# Patient Record
Sex: MALE | Race: WHITE | NOT HISPANIC OR LATINO | URBAN - METROPOLITAN AREA
[De-identification: names, ages, dates, MRNs, and addresses within clinical notes are randomized per-mention and may not be internally consistent; named-entity substitution may affect disease eponyms.]

---

## 2021-08-24 ENCOUNTER — LAB OUTSIDE AN ENCOUNTER (OUTPATIENT)
Dept: URBAN - METROPOLITAN AREA CLINIC 107 | Facility: CLINIC | Age: 66
End: 2021-08-24

## 2021-08-24 ENCOUNTER — WEB ENCOUNTER (OUTPATIENT)
Dept: URBAN - METROPOLITAN AREA CLINIC 107 | Facility: CLINIC | Age: 66
End: 2021-08-24

## 2021-08-24 ENCOUNTER — OFFICE VISIT (OUTPATIENT)
Dept: URBAN - METROPOLITAN AREA CLINIC 107 | Facility: CLINIC | Age: 66
End: 2021-08-24
Payer: COMMERCIAL

## 2021-08-24 VITALS
BODY MASS INDEX: 31.34 KG/M2 | HEART RATE: 61 BPM | WEIGHT: 195 LBS | TEMPERATURE: 98.4 F | SYSTOLIC BLOOD PRESSURE: 106 MMHG | DIASTOLIC BLOOD PRESSURE: 69 MMHG | RESPIRATION RATE: 18 BRPM | HEIGHT: 66 IN

## 2021-08-24 DIAGNOSIS — K62.5 RECTAL BLEED: ICD-10-CM

## 2021-08-24 DIAGNOSIS — K57.32 SIGMOID DIVERTICULITIS: ICD-10-CM

## 2021-08-24 PROCEDURE — 99204 OFFICE O/P NEW MOD 45 MIN: CPT | Performed by: INTERNAL MEDICINE

## 2021-08-24 RX ORDER — WHEAT DEXTRIN 3 G/3.5 G
AS DIRECTED POWDER (GRAM) ORAL
Qty: 90 GRAM | Refills: 3 | OUTPATIENT
Start: 2021-08-24 | End: 2022-08-19

## 2021-08-24 RX ORDER — SODIUM, POTASSIUM,MAG SULFATES 17.5-3.13G
354 ML SOLUTION, RECONSTITUTED, ORAL ORAL ONCE
Qty: 354 MILLILITER | Refills: 0 | OUTPATIENT
Start: 2021-08-24 | End: 2021-08-25

## 2021-08-24 NOTE — HPI-TODAY'S VISIT:
PatientPresents today for initial evaluation.  He was recently diagnosed with uncomplicated sigmoid diverticulitis in July when he presented with left lower quadrant pain.  He reports he had multiple episodes in the past over the last 5 to 10 years.  These do not happen yearly.  He was treated with antibiotics and reports complete resolution of his symptoms.  I did review his CBC and CMP which were normal.  His last colonoscopy was in 2014.  He does report these been having increasing red blood per rectum recently.  He thinks this is from hemorrhoids.  He did have hemorrhoids on previous colonoscopies by his report.  He denies straining.  He has not been on a fiber supplement.  No family history of colon cancer.  No other precipitating factors.

## 2021-08-26 PROBLEM — 427910000: Status: ACTIVE | Noted: 2021-08-24

## 2021-08-30 ENCOUNTER — OFFICE VISIT (OUTPATIENT)
Dept: URBAN - METROPOLITAN AREA SURGERY CENTER 25 | Facility: SURGERY CENTER | Age: 66
End: 2021-08-30
Payer: COMMERCIAL

## 2021-08-30 ENCOUNTER — CLAIMS CREATED FROM THE CLAIM WINDOW (OUTPATIENT)
Dept: URBAN - METROPOLITAN AREA CLINIC 4 | Facility: CLINIC | Age: 66
End: 2021-08-30
Payer: COMMERCIAL

## 2021-08-30 DIAGNOSIS — Z87.19 HISTORY OF DIVERTICULITIS: ICD-10-CM

## 2021-08-30 DIAGNOSIS — Z09 EXAMINATION FOR, FOLLOW-UP: ICD-10-CM

## 2021-08-30 DIAGNOSIS — D12.8 BENIGN NEOPLASM OF RECTUM: ICD-10-CM

## 2021-08-30 DIAGNOSIS — D12.8 ADENOMATOUS POLYP OF RECTUM: ICD-10-CM

## 2021-08-30 PROCEDURE — 88305 TISSUE EXAM BY PATHOLOGIST: CPT | Performed by: PATHOLOGY

## 2021-08-30 PROCEDURE — G8907 PT DOC NO EVENTS ON DISCHARG: HCPCS | Performed by: INTERNAL MEDICINE

## 2021-08-30 PROCEDURE — 45385 COLONOSCOPY W/LESION REMOVAL: CPT | Performed by: INTERNAL MEDICINE

## 2021-08-30 RX ORDER — WHEAT DEXTRIN 3 G/3.5 G
AS DIRECTED POWDER (GRAM) ORAL
Qty: 90 GRAM | Refills: 3 | Status: ACTIVE | COMMUNITY
Start: 2021-08-24 | End: 2022-08-19

## 2021-10-19 ENCOUNTER — OFFICE VISIT (OUTPATIENT)
Dept: URBAN - METROPOLITAN AREA CLINIC 107 | Facility: CLINIC | Age: 66
End: 2021-10-19

## 2021-12-01 ENCOUNTER — OFFICE VISIT (OUTPATIENT)
Dept: URBAN - METROPOLITAN AREA CLINIC 107 | Facility: CLINIC | Age: 66
End: 2021-12-01
Payer: COMMERCIAL

## 2021-12-01 VITALS
HEART RATE: 59 BPM | BODY MASS INDEX: 31.82 KG/M2 | TEMPERATURE: 97.8 F | HEIGHT: 66 IN | WEIGHT: 198 LBS | RESPIRATION RATE: 18 BRPM | SYSTOLIC BLOOD PRESSURE: 120 MMHG | DIASTOLIC BLOOD PRESSURE: 77 MMHG

## 2021-12-01 DIAGNOSIS — D12.8 ADENOMATOUS RECTAL POLYP: ICD-10-CM

## 2021-12-01 DIAGNOSIS — K62.5 RECTAL BLEEDING: ICD-10-CM

## 2021-12-01 DIAGNOSIS — Z87.19 HISTORY OF DIVERTICULITIS: ICD-10-CM

## 2021-12-01 PROCEDURE — 99214 OFFICE O/P EST MOD 30 MIN: CPT | Performed by: INTERNAL MEDICINE

## 2021-12-01 RX ORDER — HYDROCORTISONE ACETATE 25 MG/1
1 SUPPOSITORY SUPPOSITORY RECTAL
Qty: 10 | Refills: 1 | OUTPATIENT
Start: 2021-12-01 | End: 2021-12-21

## 2021-12-01 RX ORDER — WHEAT DEXTRIN 3 G/3.5 G
AS DIRECTED POWDER (GRAM) ORAL
Qty: 90 GRAM | Refills: 3 | Status: ACTIVE | COMMUNITY
Start: 2021-08-24 | End: 2022-08-19

## 2021-12-01 NOTE — HPI-TODAY'S VISIT:
Patient returns today in follow-up.  He had a recent episode of diverticulitis.  After which she underwent colonoscopy which showed diverticulosis as well as a 10 mm rectal tubular adenoma.  This was removed.  Pathology results reviewed with the patient.  He has had problems with intermittent rectal bleeding which she says has been persistent.  He does have occasional straining.  He denies any significant constipation.  Little hemorrhoidal tissue seen on examination.  He states this has been going on since he was an adolescent.  Denies any abdominal pain, nausea or vomiting.  No weight loss or other new complaints.

## 2024-02-09 ENCOUNTER — OV NP (OUTPATIENT)
Dept: URBAN - METROPOLITAN AREA CLINIC 113 | Facility: CLINIC | Age: 69
End: 2024-02-09

## 2024-02-09 VITALS
DIASTOLIC BLOOD PRESSURE: 87 MMHG | HEIGHT: 66 IN | HEART RATE: 57 BPM | TEMPERATURE: 97.4 F | RESPIRATION RATE: 18 BRPM | WEIGHT: 196.4 LBS | SYSTOLIC BLOOD PRESSURE: 125 MMHG | BODY MASS INDEX: 31.57 KG/M2

## 2024-02-09 DIAGNOSIS — K62.5 RECTAL BLEEDING: ICD-10-CM

## 2024-02-09 DIAGNOSIS — K57.32 SIGMOID DIVERTICULITIS: ICD-10-CM

## 2024-02-09 DIAGNOSIS — D12.8 ADENOMATOUS RECTAL POLYP: ICD-10-CM

## 2024-02-09 NOTE — HPI-TODAY'S VISIT:
67 y/o male presenting for f/u. He was last seen in Dec 2021 for f/u after diverticulosis. He was found to have a 10mm TA and recommended to f/u in 3 yrs. He is here today for hematochezia. He has bleeding once every few months when wiping. He has not had any lightheadedness or chest pain.   12/2021 Patient returns today in follow-up.  He had a recent episode of diverticulitis.  After which she underwent colonoscopy which showed diverticulosis as well as a 10 mm rectal tubular adenoma.  This was removed.  Pathology results reviewed with the patient.  He has had problems with intermittent rectal bleeding which she says has been persistent.  He does have occasional straining.  He denies any significant constipation.  Little hemorrhoidal tissue seen on examination.  He states this has been going on since he was an adolescent.  Denies any abdominal pain, nausea or vomiting.  No weight loss or other new complaints.

## 2024-02-14 ENCOUNTER — LAB (OUTPATIENT)
Dept: URBAN - METROPOLITAN AREA CLINIC 4 | Facility: CLINIC | Age: 69
End: 2024-02-14
Payer: COMMERCIAL

## 2024-02-14 ENCOUNTER — COLON (OUTPATIENT)
Dept: URBAN - METROPOLITAN AREA SURGERY CENTER 25 | Facility: SURGERY CENTER | Age: 69
End: 2024-02-14
Payer: COMMERCIAL

## 2024-02-14 DIAGNOSIS — D12.5 BENIGN NEOPLASM OF SIGMOID COLON: ICD-10-CM

## 2024-02-14 DIAGNOSIS — D12.0 BENIGN NEOPLASM OF CECUM: ICD-10-CM

## 2024-02-14 DIAGNOSIS — K64.1 GRADE II HEMORRHOIDS: ICD-10-CM

## 2024-02-14 DIAGNOSIS — K62.5 RECTAL BLEEDING: ICD-10-CM

## 2024-02-14 PROCEDURE — 45385 COLONOSCOPY W/LESION REMOVAL: CPT | Performed by: INTERNAL MEDICINE

## 2024-02-14 PROCEDURE — 45380 COLONOSCOPY AND BIOPSY: CPT | Performed by: INTERNAL MEDICINE

## 2024-02-14 PROCEDURE — 88305 TISSUE EXAM BY PATHOLOGIST: CPT | Performed by: PATHOLOGY
